# Patient Record
(demographics unavailable — no encounter records)

---

## 2024-12-12 NOTE — ASSESSMENT
[FreeTextEntry1] : Bariatric surgery history: n Overweight / obesity comorbidities: triglycerides wili nafld Current anti-obesity medications: zpb Obesity medication side effects: n  cont zpb 10 mg, follow up 2 mo to assess incr to 12.5 mg maintenance reviewed excellent progress

## 2024-12-12 NOTE — HISTORY OF PRESENT ILLNESS
[Home] : at home, [unfilled] , at the time of the visit. [Other Location: e.g. Home (Enter Location, City,State)___] : at [unfilled] [Verbal consent obtained from patient] : the patient, [unfilled] [FreeTextEntry1] : Patient presents for weight loss and overweight/obese comorbidity management  doing v well waist has shrunk and in the mode with lifestyle has several months of  10 mg appetite v suppressed, 1 meal per day, intermittent fasting feeling well

## 2025-01-06 NOTE — DISCUSSION/SUMMARY
[de-identified] : 52-year-old male with right lateral epicondylitis  I discussed with the patient the treatment of lateral epicondylitis. I discussed that this is a degenerative condition rather than an inflammatory condition and that the process tends to be reversible (albeit can last from 6-24mos if left untreated). The best source of treatment is to reduce the offending repetitive overuse injury process and I counseled the patient on how to do this. First line treatment can include watchful waiting for a period of 6-8 wks with specific activity modification and OTC NSAID's/Tylenol. Further treatment includes the use of counterforce bracing, physical therapy for stretching and strengthening, and the use of injection therapy (steroids/PRP etc). I also discussed the potential role of surgical intervention for chronic symptoms that persist greater than 6-12 months.  At this time as treatment I recommended a period of relative rest, stretching and strengthening modalities as indicated in a patient handout provided as well as counterforce bracing. If no improvement over the next few months, additional treatment such as corticosteroid injection versus formal physical therapy can be performed.  We will see the patient back as needed.

## 2025-01-06 NOTE — PHYSICAL EXAM
[de-identified] : Right elbow exam  Skin: Clean, dry, intact. No ecchymosis. No swelling. No palpable joint effusion. ROM: Full extension/flexion, full supination/pronation.   Painful ROM: Lateral elbow pain with resisted wrist extension Tenderness: No medial epicondyle pain. Positive lateral epicondyle pain (ECRB). No olecranon pain. No pain at radial head. No pain to radial tunnel. Strength: 5/5 elbow flexion, 5/5 elbow extension, 5/5 supination, 5/5 pronation Stability: Stable to vaus/valgus stress Vasc: 2+ radial pulse, <2s cap refill Sensation: In tact to light touch throughout Neuro: Negative tinels at ulnar canal, AIN/PIN/Ulnar nerve in tact to motor/sensation. [de-identified] :  The following radiographs were ordered and read by me during this patients visit. I reviewed each radiograph in detail with the patient and discussed the findings as highlighted below.   3 views of the right elbow were obtained today that show no acute fracture or dislocation.  There is evidence of calcification at the distal biceps, evidence of spur formation lateral epicondyle.

## 2025-01-06 NOTE — PHYSICAL EXAM
[de-identified] : Right elbow exam  Skin: Clean, dry, intact. No ecchymosis. No swelling. No palpable joint effusion. ROM: Full extension/flexion, full supination/pronation.   Painful ROM: Lateral elbow pain with resisted wrist extension Tenderness: No medial epicondyle pain. Positive lateral epicondyle pain (ECRB). No olecranon pain. No pain at radial head. No pain to radial tunnel. Strength: 5/5 elbow flexion, 5/5 elbow extension, 5/5 supination, 5/5 pronation Stability: Stable to vaus/valgus stress Vasc: 2+ radial pulse, <2s cap refill Sensation: In tact to light touch throughout Neuro: Negative tinels at ulnar canal, AIN/PIN/Ulnar nerve in tact to motor/sensation. [de-identified] :  The following radiographs were ordered and read by me during this patients visit. I reviewed each radiograph in detail with the patient and discussed the findings as highlighted below.   3 views of the right elbow were obtained today that show no acute fracture or dislocation.  There is evidence of calcification at the distal biceps, evidence of spur formation lateral epicondyle.

## 2025-01-06 NOTE — HISTORY OF PRESENT ILLNESS
[de-identified] : 52 year old male presents today with right elbow pain since December 2024. Patient developed pain after putting hand in to boxing gloves. Localizes pain to the lateral aspect of elbow. NSAIDs does not provide relief. Prior distal biceps tendon repair >10yrs ago.

## 2025-01-06 NOTE — HISTORY OF PRESENT ILLNESS
[de-identified] : 52 year old male presents today with right elbow pain since December 2024. Patient developed pain after putting hand in to boxing gloves. Localizes pain to the lateral aspect of elbow. NSAIDs does not provide relief. Prior distal biceps tendon repair >10yrs ago.

## 2025-01-06 NOTE — DISCUSSION/SUMMARY
[de-identified] : 52-year-old male with right lateral epicondylitis  I discussed with the patient the treatment of lateral epicondylitis. I discussed that this is a degenerative condition rather than an inflammatory condition and that the process tends to be reversible (albeit can last from 6-24mos if left untreated). The best source of treatment is to reduce the offending repetitive overuse injury process and I counseled the patient on how to do this. First line treatment can include watchful waiting for a period of 6-8 wks with specific activity modification and OTC NSAID's/Tylenol. Further treatment includes the use of counterforce bracing, physical therapy for stretching and strengthening, and the use of injection therapy (steroids/PRP etc). I also discussed the potential role of surgical intervention for chronic symptoms that persist greater than 6-12 months.  At this time as treatment I recommended a period of relative rest, stretching and strengthening modalities as indicated in a patient handout provided as well as counterforce bracing. If no improvement over the next few months, additional treatment such as corticosteroid injection versus formal physical therapy can be performed.  We will see the patient back as needed.

## 2025-02-06 NOTE — HISTORY OF PRESENT ILLNESS
Seropositive rheumatoid arthritis:   Continue Orencia injections once a week  Add methotrexate 10 mg once a week with folic acid daily  Prednisone between 10 and 15 mg daily  Blood work today and repeat in 1 month    Vitamin D deficiency  Vitamin D 50,000 international units once a week for total of 3 months then switch to 1000 international units/day for maintenance therapy   [Home] : at home, [unfilled] , at the time of the visit. [Other Location: e.g. Home (Enter Location, City,State)___] : at [unfilled] [Verbal consent obtained from patient] : the patient, [unfilled] [FreeTextEntry1] : Patient presents for weight loss and overweight/obese comorbidity management  doing well reports drop in weight and now plateauing many recent stessors, holiday season, severe colds and uable to work out during this time planning to return to gym as things calm down tolerating zpb 10 mg well, ready to incr

## 2025-02-06 NOTE — ASSESSMENT
[FreeTextEntry1] : Bariatric surgery history: n Overweight / obesity comorbidities: triglycerides predm wili Current anti-obesity medications: zpb Obesity medication side effects: n  incr zpb to 15 mg 6 mo follow ups or sooner prn

## 2025-04-09 NOTE — ASSESSMENT
[FreeTextEntry1] : 1) reviewed differential of his chest pressure; no pain/pressure now; responded well to his alprazolam for anxiety/occ panic feeling.  Would have him come in for ECG at time of the pain for more value to ECG.  He can call.  Doubt esophageal/pulm/musc skel/card by his description.   Had stress thall as younger man when being cleared for his gastric reduction years ago.  2) reviewed all labs; has sl hi TG, HDL 38, excellent LDL numerically, glycemic indices wnl.    24 minutes were spent in preparation of this visit, including review of previous notes and test results, interview and examination of patient, contact with other care contributors, discussion of plan, arranging for appropriate testing and treatment, and documentation.

## 2025-04-09 NOTE — PHYSICAL EXAM
[Normal] : no acute distress, well nourished, well developed and well-appearing [No JVD] : no jugular venous distention [Supple] : supple [No Respiratory Distress] : no respiratory distress  [No Accessory Muscle Use] : no accessory muscle use [Clear to Auscultation] : lungs were clear to auscultation bilaterally [Normal Rate] : normal rate  [Regular Rhythm] : with a regular rhythm [Normal S1, S2] : normal S1 and S2 [No Murmur] : no murmur heard [No Edema] : there was no peripheral edema [No Extremity Clubbing/Cyanosis] : no extremity clubbing/cyanosis

## 2025-04-09 NOTE — HISTORY OF PRESENT ILLNESS
[FreeTextEntry8] : Here because he's had some episodes of chest pressure.  Admits when he's had these recent episodes he's been stressed/thinking about things that have him worried.  Not exertional, not associated with palpitations at the time, has no radiation from L mid/upper chest where he feels it.  Was getting some dysesthesias L face with them.  All resolved with time, but last episode promptly left with alprazolam that he gets for anxiety/panic attacks.    Did his labs for upcoming cpe ahead of time - reviewed them today.

## 2025-04-24 NOTE — HISTORY OF PRESENT ILLNESS
[FreeTextEntry1] : Here for annual exam and to f/u weight( s/p banding), LS stenosis s/p laminectomy, tendency for asthmatic bronchitis w URIs, preDM, anxiety w occasional panic attacks [de-identified] : Here for annual exam.  Feeling fairly well.   From card/pulm/GI/ perspective rest of rosyx is negative, feels very well.  In gym daily, playing guDropifir, things good with his wife/family.  He can have work related anxiety/struggles at times, can proceed to panic attacks - had chest syx last visit with same.  Overall CV risk factors low x for preDM eradicated w wt loss, TG/HDL/dyslipidemia.  Sees weight management here, on GLP1/zepbound.

## 2025-04-24 NOTE — ASSESSMENT
[FreeTextEntry1] : 1) HCM - flu vacc encouraged. TDAP utd 2021. COVID vacc x 3, reminded of bivalent vacc, he'll consider - fairly hesitant re overall vaccines on conversation today. SANDRA, SB, SD, sunblock, exercise/wt loss strategy discussed. Colon screen nicely utd, no polyps. Full labs for cpe ordered ahead and reviewed, favorable.   A1C 5.1. Stable lipids - pushing exercise for HDL, HDL generally better w wt loss/exercise.  At moment would like to avoid statin.  2) tendency for asthmatic responses to URIs - quiescent. 3) LS stenosis - s/p laminectomy, then epidurals post op for residual syx - now doing much better.  Has occ LBP, rxs symptomatically.  4) KRUNAL - has CPAP, responds to wt loss; on zepbound and working with Dr. Mckinnon here.  5) recent anxiety/chest pressure with it resolved.  Has alprazolam for moments, does require it to avoid full blown attack.          [Vaccines Reviewed] : Immunizations reviewed today. Please see immunization details in the vaccine log within the immunization flowsheet.

## 2025-04-24 NOTE — PHYSICAL EXAM
[No Acute Distress] : no acute distress [Well Nourished] : well nourished [Well Developed] : well developed [Well-Appearing] : well-appearing [Normal Sclera/Conjunctiva] : normal sclera/conjunctiva [PERRL] : pupils equal round and reactive to light [EOMI] : extraocular movements intact [Normal Outer Ear/Nose] : the outer ears and nose were normal in appearance [Normal Oropharynx] : the oropharynx was normal [Normal TMs] : both tympanic membranes were normal [Normal Nasal Mucosa] : the nasal mucosa was normal [No JVD] : no jugular venous distention [No Lymphadenopathy] : no lymphadenopathy [Supple] : supple [Thyroid Normal, No Nodules] : the thyroid was normal and there were no nodules present [No Respiratory Distress] : no respiratory distress  [No Accessory Muscle Use] : no accessory muscle use [Clear to Auscultation] : lungs were clear to auscultation bilaterally [Normal Percussion] : the chest was normal to percussion [Normal Rate] : normal rate  [Regular Rhythm] : with a regular rhythm [Normal S1, S2] : normal S1 and S2 [No Murmur] : no murmur heard [No Carotid Bruits] : no carotid bruits [No Abdominal Bruit] : a ~M bruit was not heard ~T in the abdomen [No Varicosities] : no varicosities [Pedal Pulses Present] : the pedal pulses are present [No Edema] : there was no peripheral edema [No Palpable Aorta] : no palpable aorta [No Extremity Clubbing/Cyanosis] : no extremity clubbing/cyanosis [Soft] : abdomen soft [Non Tender] : non-tender [Non-distended] : non-distended [No Masses] : no abdominal mass palpated [No HSM] : no HSM [Normal Bowel Sounds] : normal bowel sounds [Normal Supraclavicular Nodes] : no supraclavicular lymphadenopathy [Normal Posterior Cervical Nodes] : no posterior cervical lymphadenopathy [Normal Anterior Cervical Nodes] : no anterior cervical lymphadenopathy [No CVA Tenderness] : no CVA  tenderness [No Spinal Tenderness] : no spinal tenderness [No Joint Swelling] : no joint swelling [Grossly Normal Strength/Tone] : grossly normal strength/tone [No Rash] : no rash [No Skin Lesions] : no skin lesions [Coordination Grossly Intact] : coordination grossly intact [No Focal Deficits] : no focal deficits [Normal Gait] : normal gait [Deep Tendon Reflexes (DTR)] : deep tendon reflexes were 2+ and symmetric [Speech Grossly Normal] : speech grossly normal [Memory Grossly Normal] : memory grossly normal [Normal Affect] : the affect was normal [Alert and Oriented x3] : oriented to person, place, and time [Normal Mood] : the mood was normal [Normal Insight/Judgement] : insight and judgment were intact [de-identified] : no suspicious nevi noted

## 2025-04-24 NOTE — COUNSELING
[Sleep ___ hours/day] : Sleep [unfilled] hours/day [Engage in a relaxing activity] : Engage in a relaxing activity [AUDIT-C Screening administered and reviewed] : AUDIT-C Screening administered and reviewed [Hazards of at-risk alcohol use discussed] : Hazards of at-risk alcohol use discussed [Benefits of weight loss discussed] : Benefits of weight loss discussed [Encouraged to increase physical activity] : Encouraged to increase physical activity [Decrease Portions] : decrease portions [____ min/wk Activity] : [unfilled] min/wk activity [None] : None [Good understanding] : Patient has a good understanding of lifestyle changes and steps needed to achieve self management goal

## 2025-04-24 NOTE — HEALTH RISK ASSESSMENT
[Very Good] : ~his/her~  mood as very good [Yes] : Yes [2 - 4 times a month (2 pts)] : 2-4 times a month (2 points) [1 or 2 (0 pts)] : 1 or 2 (0 points) [No] : In the past 12 months have you used drugs other than those required for medical reasons? No [No falls in past year] : Patient reported no falls in the past year [0] : 2) Feeling down, depressed, or hopeless: Not at all (0) [PHQ-2 Negative - No further assessment needed] : PHQ-2 Negative - No further assessment needed [Audit-CScore] : 2 [PXX1Absiw] : 0 [Never] : Never [NO] : No [None] : None [With Significant Other] : lives with significant other [Employed] : employed [Graduate School] : graduate school [] :  [# Of Children ___] : has [unfilled] children [High Risk Behavior] : no high risk behavior [Feels Safe at Home] : Feels safe at home [Fully functional (bathing, dressing, toileting, transferring, walking, feeding)] : Fully functional (bathing, dressing, toileting, transferring, walking, feeding) [Fully functional (using the telephone, shopping, preparing meals, housekeeping, doing laundry, using] : Fully functional and needs no help or supervision to perform IADLs (using the telephone, shopping, preparing meals, housekeeping, doing laundry, using transportation, managing medications and managing finances) [Reports changes in hearing] : Reports no changes in hearing [Reports changes in vision] : Reports no changes in vision [Reports normal functional visual acuity (ie: able to read med bottle)] : Reports normal functional visual acuity [Reports changes in dental health] : Reports no changes in dental health [Smoke Detector] : smoke detector [Carbon Monoxide Detector] : carbon monoxide detector [Safety elements used in home] : safety elements used in home [Seat Belt] :  uses seat belt [Sunscreen] : uses sunscreen [Travel to Developing Areas] : does not  travel to developing areas [TB Exposure] : is not being exposed to tuberculosis [Caregiver Concerns] : does not have caregiver concerns [ColonoscopyDate] : 09/23 [ColonoscopyComments] : no polyps
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
## 2025-06-26 NOTE — PROCEDURE
[FreeTextEntry1] : CXR (06/26/2025) reveals a normal sized heart; elevated R anterior hemidiaphragm, no evidence of infiltrate or effusion  Full PFT reveals normal flows; FEV1 was 4.29 L which is 119% of predicted; with no change on BD, normal lung volumes; normal diffusion at 35.84, which is 124% of predicted; normal flow volume loop. RAHEEL test revealed normal MIP max of 100%; normal MEP max of 82% PFTs were performed to evaluate for    SOB  6 minute walk test reveals a low saturation of  97%, walked 342.3   meters   FENO was 16 ; a normal value being less than 25 Fractional exhaled nitric oxide (FENO) is regarded as a simple, noninvasive method for assessing eosinophilic airway inflammation. Produced by a variety of cells within the lung, nitric oxide (NO) concentrations are generally low in healthy individuals. However, high concentrations of NO appear to be involved in nonspecific host defense mechanisms and chronic inflammatory diseases such as asthma. The American Thoracic Society (ATS) therefore has recommended using FENO to aid in the diagnosis and monitoring of eosinophilic airway inflammation and asthma, and for identifying steroid responsive individuals whose chronic respiratory symptoms may be caused by airway inflammation.

## 2025-06-26 NOTE — REASON FOR VISIT
[Initial] : an initial visit [TextBox_44] : SOB, mild intermittent asthma, allergies, KRUNAL on CPAP

## 2025-06-26 NOTE — ASSESSMENT
[FreeTextEntry1] : Mr. SWEENEY is a 53-year-old male with a history of lumbar spine Dz s/p surgery, orthopedic maladies, elevated triglycerides, obesity s/p Gastric Restrictive Proc By Adjustable Gastric Band, GERD, anxiety, fatigue, fatty liver, ?asthma, KRUNAL on CPAP presenting to the office today for an initial pulmonary evaluation for SOB, mild intermittent asthma, allergies, KRUNAL on CPAP   His shortness of breath is multifactorial due to: -poor mechanics of breathing -out of shape -overweight -pulmonary disease   -mild intermittent asthma   -RAHEEL weakness -?cardiac disease   Problem 1: mild intermittent asthma -add Symbicort 160 2 inhalations BID- SMART -Asthma is believed to be caused by inherited (genetic) and environmental factor, but its exact cause is unknown. Asthma may be triggered by allergens, lung infections, or irritants in the air. Asthma triggers are different for each person.  -Inhaler technique reviewed as well as oral hygiene techniques reviewed with patient. Avoidance of cold air, extremes of temperature, rescue inhaler should be used before exercise. Order of medication reviewed with patient. Recommended adequate hydration and use of a cool mist humidifier in the bedroom     Problem 2: allergies/sinus -complete blood work: asthma panel, food IgE panel, IgE level, eosinophil level, vitamin D level  -Environmental measures for allergies were encouraged including mattress and pillow covers, air purifier, and environmental controls.    Problem 3: KRUNAL on CPAP (elevated Mallampati class, nocturia, poor quality sleep, snoring, neck size >16) -CPAP in place, 7-8 cm H2O, AirFit P10 nasal mask, AHI 0.2 -repeat home sleep study -Sleep apnea is associated with adverse clinical consequences which can affect most organ systems. Cardiovascular disease risk includes arrhythmias, atrial fibrillation, hypertension, coronary artery disease, and stroke. Metabolic disorders include diabetes type 2, non-alcoholic fatty liver disease. Mood disorder especially depression; and cognitive decline especially in the elderly. Associations with chronic reflux/Perez's esophagus some but not all inclusive. -Reasons include arousal consistent with hypopnea; respiratory events most prominent in REM sleep or supine position; therefore sleep staging and body position are important for accurate diagnosis and estimation of AHI.    Problem 4: ?cardiac disease -recommended to continue to follow up with Cardiologist    Problem 5: poor breathing mechanics -Recommended Yadi Davison and Butgiovani breathing technique -Proper breathing techniques were reviewed with an emphasis on exhalation. Patient was instructed to breathe in for 1 second and out for 4 seconds. The patient was encouraged to not talk while walking.   Problem 6: overweight/ out of shape -s/p lap band surgery (Dr. Blackwell) -Weight loss, exercise, and diet control were discussed and are highly encouraged. Treatment options are given such as aqua therapy, and contacting a nutritionist. Recommended to use the elliptical, stationary bike, less use of the treadmill.   Problem 7: health maintenance -s/p yearly flu shot 2024 -recommended strep pneumonia vaccines: Prevnar-21 vaccine -recommended early intervention for Upper Respiratory Infections (URIs) -recommended regular osteoporosis evaluations -recommended early dermatological evaluations -recommended after the age of 50 to the age of 70, colonoscopy every 5 years   F/P in 6-8 weeks. He is encouraged to call with any changes, concerns, or questions

## 2025-07-17 NOTE — HISTORY OF PRESENT ILLNESS
[Home] : at home, [unfilled] , at the time of the visit. [Other Location: e.g. Home (Enter Location, City,State)___] : at [unfilled] [Telehealth (audio & video)] : This visit was provided via telehealth using real-time 2-way audio visual technology. [Verbal consent obtained from patient] : the patient, [unfilled] [FreeTextEntry1] : Patient presents for weight loss and overweight/obese comorbidity management   has maintained wt loss and back exercising again vigorously feeling well no complaints q's or concerns